# Patient Record
Sex: MALE | Race: ASIAN | NOT HISPANIC OR LATINO
[De-identification: names, ages, dates, MRNs, and addresses within clinical notes are randomized per-mention and may not be internally consistent; named-entity substitution may affect disease eponyms.]

---

## 2017-02-28 ENCOUNTER — TRANSCRIPTION ENCOUNTER (OUTPATIENT)
Age: 57
End: 2017-02-28

## 2017-02-28 ENCOUNTER — CLINICAL ADVICE (OUTPATIENT)
Age: 57
End: 2017-02-28

## 2017-02-28 RX ORDER — DICLOFENAC SODIUM 10 MG/G
1 GEL TOPICAL
Qty: 1 | Refills: 3 | Status: ACTIVE | COMMUNITY
Start: 2017-02-28 | End: 1900-01-01

## 2017-03-30 ENCOUNTER — TRANSCRIPTION ENCOUNTER (OUTPATIENT)
Age: 57
End: 2017-03-30

## 2017-03-31 ENCOUNTER — APPOINTMENT (OUTPATIENT)
Dept: INTERNAL MEDICINE | Facility: CLINIC | Age: 57
End: 2017-03-31

## 2017-03-31 ENCOUNTER — TRANSCRIPTION ENCOUNTER (OUTPATIENT)
Age: 57
End: 2017-03-31

## 2017-03-31 VITALS
HEIGHT: 68 IN | DIASTOLIC BLOOD PRESSURE: 86 MMHG | OXYGEN SATURATION: 98 % | HEART RATE: 70 BPM | TEMPERATURE: 98.6 F | SYSTOLIC BLOOD PRESSURE: 133 MMHG | BODY MASS INDEX: 23.95 KG/M2 | RESPIRATION RATE: 17 BRPM | WEIGHT: 158 LBS

## 2017-03-31 DIAGNOSIS — M72.2 PLANTAR FASCIAL FIBROMATOSIS: ICD-10-CM

## 2017-03-31 DIAGNOSIS — Z23 ENCOUNTER FOR IMMUNIZATION: ICD-10-CM

## 2017-03-31 DIAGNOSIS — Z20.5 CONTACT WITH AND (SUSPECTED) EXPOSURE TO VIRAL HEPATITIS: ICD-10-CM

## 2017-03-31 DIAGNOSIS — M79.674 PAIN IN RIGHT TOE(S): ICD-10-CM

## 2017-03-31 DIAGNOSIS — M54.5 LOW BACK PAIN: ICD-10-CM

## 2017-03-31 DIAGNOSIS — Z11.3 ENCOUNTER FOR SCREENING FOR INFECTIONS WITH A PREDOMINANTLY SEXUAL MODE OF TRANSMISSION: ICD-10-CM

## 2017-03-31 DIAGNOSIS — M25.662 STIFFNESS OF LEFT KNEE, NOT ELSEWHERE CLASSIFIED: ICD-10-CM

## 2017-03-31 RX ORDER — IBUPROFEN 600 MG/1
600 TABLET, FILM COATED ORAL
Qty: 20 | Refills: 0 | Status: ACTIVE | COMMUNITY
Start: 2016-12-02

## 2017-03-31 RX ORDER — CHLORHEXIDINE GLUCONATE, 0.12% ORAL RINSE 1.2 MG/ML
0.12 SOLUTION DENTAL
Qty: 473 | Refills: 0 | Status: COMPLETED | COMMUNITY
Start: 2016-12-02 | End: 2017-03-31

## 2017-03-31 RX ORDER — AMOXICILLIN 500 MG/1
500 CAPSULE ORAL
Qty: 21 | Refills: 0 | Status: COMPLETED | COMMUNITY
Start: 2016-12-02 | End: 2017-03-31

## 2017-04-04 ENCOUNTER — TRANSCRIPTION ENCOUNTER (OUTPATIENT)
Age: 57
End: 2017-04-04

## 2017-04-10 ENCOUNTER — TRANSCRIPTION ENCOUNTER (OUTPATIENT)
Age: 57
End: 2017-04-10

## 2017-06-09 ENCOUNTER — TRANSCRIPTION ENCOUNTER (OUTPATIENT)
Age: 57
End: 2017-06-09

## 2017-06-13 ENCOUNTER — TRANSCRIPTION ENCOUNTER (OUTPATIENT)
Age: 57
End: 2017-06-13

## 2017-07-25 ENCOUNTER — TRANSCRIPTION ENCOUNTER (OUTPATIENT)
Age: 57
End: 2017-07-25

## 2017-08-08 ENCOUNTER — TRANSCRIPTION ENCOUNTER (OUTPATIENT)
Age: 57
End: 2017-08-08

## 2017-08-08 DIAGNOSIS — M47.9 SPONDYLOSIS, UNSPECIFIED: ICD-10-CM

## 2017-08-15 ENCOUNTER — TRANSCRIPTION ENCOUNTER (OUTPATIENT)
Age: 57
End: 2017-08-15

## 2017-08-16 ENCOUNTER — TRANSCRIPTION ENCOUNTER (OUTPATIENT)
Age: 57
End: 2017-08-16

## 2017-08-28 ENCOUNTER — TRANSCRIPTION ENCOUNTER (OUTPATIENT)
Age: 57
End: 2017-08-28

## 2017-08-30 ENCOUNTER — TRANSCRIPTION ENCOUNTER (OUTPATIENT)
Age: 57
End: 2017-08-30

## 2017-08-31 ENCOUNTER — TRANSCRIPTION ENCOUNTER (OUTPATIENT)
Age: 57
End: 2017-08-31

## 2017-09-01 ENCOUNTER — TRANSCRIPTION ENCOUNTER (OUTPATIENT)
Age: 57
End: 2017-09-01

## 2017-09-06 ENCOUNTER — TRANSCRIPTION ENCOUNTER (OUTPATIENT)
Age: 57
End: 2017-09-06

## 2017-09-21 ENCOUNTER — TRANSCRIPTION ENCOUNTER (OUTPATIENT)
Age: 57
End: 2017-09-21

## 2017-09-22 ENCOUNTER — TRANSCRIPTION ENCOUNTER (OUTPATIENT)
Age: 57
End: 2017-09-22

## 2017-10-02 ENCOUNTER — TRANSCRIPTION ENCOUNTER (OUTPATIENT)
Age: 57
End: 2017-10-02

## 2017-11-01 ENCOUNTER — TRANSCRIPTION ENCOUNTER (OUTPATIENT)
Age: 57
End: 2017-11-01

## 2018-01-02 ENCOUNTER — TRANSCRIPTION ENCOUNTER (OUTPATIENT)
Age: 58
End: 2018-01-02

## 2019-02-08 ENCOUNTER — APPOINTMENT (OUTPATIENT)
Dept: UROLOGY | Facility: CLINIC | Age: 59
End: 2019-02-08
Payer: COMMERCIAL

## 2019-02-08 VITALS
WEIGHT: 150 LBS | RESPIRATION RATE: 17 BRPM | TEMPERATURE: 98 F | OXYGEN SATURATION: 97 % | DIASTOLIC BLOOD PRESSURE: 71 MMHG | HEART RATE: 88 BPM | HEIGHT: 68 IN | BODY MASS INDEX: 22.73 KG/M2 | SYSTOLIC BLOOD PRESSURE: 123 MMHG

## 2019-02-08 DIAGNOSIS — N50.812 LEFT TESTICULAR PAIN: ICD-10-CM

## 2019-02-08 DIAGNOSIS — R71.8 OTHER ABNORMALITY OF RED BLOOD CELLS: ICD-10-CM

## 2019-02-08 DIAGNOSIS — M48.00 SPINAL STENOSIS, SITE UNSPECIFIED: ICD-10-CM

## 2019-02-08 PROCEDURE — 99203 OFFICE O/P NEW LOW 30 MIN: CPT

## 2019-02-09 LAB
ANION GAP SERPL CALC-SCNC: 14 MMOL/L
APPEARANCE: CLEAR
BACTERIA: NEGATIVE
BILIRUBIN URINE: NEGATIVE
BLOOD URINE: NEGATIVE
BUN SERPL-MCNC: 16 MG/DL
CALCIUM SERPL-MCNC: 9.2 MG/DL
CHLORIDE SERPL-SCNC: 102 MMOL/L
CO2 SERPL-SCNC: 27 MMOL/L
COLOR: YELLOW
CREAT SERPL-MCNC: 1.13 MG/DL
GLUCOSE QUALITATIVE U: NEGATIVE MG/DL
GLUCOSE SERPL-MCNC: 68 MG/DL
HYALINE CASTS: 1 /LPF
KETONES URINE: ABNORMAL
LEUKOCYTE ESTERASE URINE: NEGATIVE
MICROSCOPIC-UA: NORMAL
NITRITE URINE: NEGATIVE
PH URINE: 6
POTASSIUM SERPL-SCNC: 4.1 MMOL/L
PROTEIN URINE: NEGATIVE MG/DL
PSA FREE FLD-MCNC: 33 %
PSA FREE SERPL-MCNC: 0.3 NG/ML
PSA SERPL-MCNC: 0.91 NG/ML
RED BLOOD CELLS URINE: 1 /HPF
SODIUM SERPL-SCNC: 143 MMOL/L
SPECIFIC GRAVITY URINE: 1.02
SQUAMOUS EPITHELIAL CELLS: 0 /HPF
UROBILINOGEN URINE: NEGATIVE MG/DL
WHITE BLOOD CELLS URINE: 1 /HPF

## 2019-02-10 NOTE — LETTER BODY
[FreeTextEntry1] : Maribel Rodriguez MD\par [address] [address]\par P [phone]\par F [phone]\par \par Dear Dr. Rodriguez,\par \par Reason for Visit: Erectile Dysfunction. Left testicular discomfort\par \par This is a 58 year-old male  s/p previous laminectomy for spinal stenosis presents with erectile dysfunction and intermittent left testicular discomfort. Patient is referred for evaluation of his condition. He reports no improvement of ED symptoms while taking Viagra and Cialis. He obtained an unremarkable abdominal MRI at [facility] in 2016. Patient denies any gross hematuria or dysuria or urinary incontinence. The patient denies any aggravating or relieving factors. The patient denies any interference of function. The patient is entirely asymptomatic. All other review of systems are negative. He has no cancer in his family medical history. He has no previous surgical history. Past medical history, family history and social history were inquired and were noncontributory to current condition. The patient does not use tobacco or drink alcohol. Medications and allergies were reviewed. He has no known allergies to medication. \par \par On examination, the patient is a healthy-appearing gentleman in no acute distress. He is alert and oriented follows commands. He has normal mood and affect. He is normocephalic. Oral no thrush. Neck is supple. Respirations are unlabored. His abdomen is soft and nontender. Liver is nonpalpable. Bladder is nonpalpable. No CVA tenderness. Neurologically he is grossly intact. No peripheral edema. Skin without gross abnormality. He has normal male external genitalia. Normal meatus. Bilateral testes are descended intrascrotally and normal to palpation. On rectal examination, there is normal sphincter tone. The prostate is clinically benign without focal induration or nodularity.\par \par Assessment: Erectile Dysfunction. Left testicular discomfort.\par \par I counseled the patient. I discussed the various etiologies of his symptoms. In terms of his erectile dysfunction and left testicular discomfort; he has minimal bother. I recommed conservative management. he will obtain BMP, PSA and urinalysis today to further evaluate. Risks and alternatives were discussed. I answered the patient questions. The patient will follow-up as directed and will contact me with any questions or concerns. Thank you for the opportunity to participate in the care of this patient. I'll keep you updated on his progress.\par \par Plan: BMP. PSA. Urinalysis. Follow up in 1 year.

## 2019-02-10 NOTE — ADDENDUM
[FreeTextEntry1] : Entered by Tanya Alford, acting as scribe for Dr. Maxime Rocha.\par \par The documentation recorded by the scribe accurately reflects the service I personally performed and the decisions made by me.

## 2019-03-07 ENCOUNTER — TRANSCRIPTION ENCOUNTER (OUTPATIENT)
Age: 59
End: 2019-03-07

## 2019-03-08 ENCOUNTER — APPOINTMENT (OUTPATIENT)
Dept: CARDIOLOGY | Facility: CLINIC | Age: 59
End: 2019-03-08
Payer: COMMERCIAL

## 2019-03-08 ENCOUNTER — NON-APPOINTMENT (OUTPATIENT)
Age: 59
End: 2019-03-08

## 2019-03-08 VITALS
OXYGEN SATURATION: 98 % | WEIGHT: 150 LBS | RESPIRATION RATE: 17 BRPM | HEART RATE: 76 BPM | BODY MASS INDEX: 22.73 KG/M2 | SYSTOLIC BLOOD PRESSURE: 125 MMHG | DIASTOLIC BLOOD PRESSURE: 72 MMHG | TEMPERATURE: 98 F | HEIGHT: 68 IN

## 2019-03-08 DIAGNOSIS — E78.5 HYPERLIPIDEMIA, UNSPECIFIED: ICD-10-CM

## 2019-03-08 DIAGNOSIS — Z82.49 FAMILY HISTORY OF ISCHEMIC HEART DISEASE AND OTHER DISEASES OF THE CIRCULATORY SYSTEM: ICD-10-CM

## 2019-03-08 PROCEDURE — 99204 OFFICE O/P NEW MOD 45 MIN: CPT

## 2019-03-08 PROCEDURE — 93000 ELECTROCARDIOGRAM COMPLETE: CPT

## 2019-03-08 NOTE — PHYSICAL EXAM
[General Appearance - Well Developed] : well developed [Normal Appearance] : normal appearance [Well Groomed] : well groomed [General Appearance - Well Nourished] : well nourished [No Deformities] : no deformities [General Appearance - In No Acute Distress] : no acute distress [Normal Conjunctiva] : the conjunctiva exhibited no abnormalities [Eyelids - No Xanthelasma] : the eyelids demonstrated no xanthelasmas [Normal Oral Mucosa] : normal oral mucosa [No Oral Pallor] : no oral pallor [No Oral Cyanosis] : no oral cyanosis [Normal Jugular Venous A Waves Present] : normal jugular venous A waves present [Normal Jugular Venous V Waves Present] : normal jugular venous V waves present [No Jugular Venous Marinelli A Waves] : no jugular venous marinelli A waves [Heart Rate And Rhythm] : heart rate and rhythm were normal [Heart Sounds] : normal S1 and S2 [Murmurs] : no murmurs present [Respiration, Rhythm And Depth] : normal respiratory rhythm and effort [Exaggerated Use Of Accessory Muscles For Inspiration] : no accessory muscle use [Auscultation Breath Sounds / Voice Sounds] : lungs were clear to auscultation bilaterally [Abdomen Soft] : soft [Abdomen Tenderness] : non-tender [Abdomen Mass (___ Cm)] : no abdominal mass palpated [Abnormal Walk] : normal gait [Gait - Sufficient For Exercise Testing] : the gait was sufficient for exercise testing [Nail Clubbing] : no clubbing of the fingernails [Cyanosis, Localized] : no localized cyanosis [Petechial Hemorrhages (___cm)] : no petechial hemorrhages [] : no ischemic changes [Oriented To Time, Place, And Person] : oriented to person, place, and time [Affect] : the affect was normal [Mood] : the mood was normal [No Anxiety] : not feeling anxious

## 2019-03-19 PROBLEM — E78.5 HLD (HYPERLIPIDEMIA): Status: ACTIVE | Noted: 2019-03-19

## 2019-03-28 ENCOUNTER — RECORD ABSTRACTING (OUTPATIENT)
Age: 59
End: 2019-03-28

## 2019-04-01 ENCOUNTER — TRANSCRIPTION ENCOUNTER (OUTPATIENT)
Age: 59
End: 2019-04-01

## 2019-04-19 ENCOUNTER — APPOINTMENT (OUTPATIENT)
Dept: CARDIOLOGY | Facility: CLINIC | Age: 59
End: 2019-04-19

## 2019-04-19 ENCOUNTER — APPOINTMENT (OUTPATIENT)
Dept: CARDIOLOGY | Facility: CLINIC | Age: 59
End: 2019-04-19
Payer: COMMERCIAL

## 2019-04-19 VITALS
RESPIRATION RATE: 17 BRPM | SYSTOLIC BLOOD PRESSURE: 124 MMHG | DIASTOLIC BLOOD PRESSURE: 75 MMHG | HEART RATE: 69 BPM | BODY MASS INDEX: 22.96 KG/M2 | OXYGEN SATURATION: 98 % | TEMPERATURE: 98.5 F | WEIGHT: 151 LBS

## 2019-04-19 PROCEDURE — 93306 TTE W/DOPPLER COMPLETE: CPT

## 2019-04-19 PROCEDURE — 93015 CV STRESS TEST SUPVJ I&R: CPT

## 2020-02-07 ENCOUNTER — APPOINTMENT (OUTPATIENT)
Dept: UROLOGY | Facility: CLINIC | Age: 60
End: 2020-02-07
Payer: COMMERCIAL

## 2020-02-07 VITALS
HEART RATE: 92 BPM | DIASTOLIC BLOOD PRESSURE: 80 MMHG | SYSTOLIC BLOOD PRESSURE: 126 MMHG | TEMPERATURE: 97.7 F | RESPIRATION RATE: 17 BRPM | BODY MASS INDEX: 23.87 KG/M2 | OXYGEN SATURATION: 98 % | WEIGHT: 157 LBS

## 2020-02-07 DIAGNOSIS — Z00.00 ENCOUNTER FOR GENERAL ADULT MEDICAL EXAMINATION W/OUT ABNORMAL FINDINGS: ICD-10-CM

## 2020-02-07 PROCEDURE — 99214 OFFICE O/P EST MOD 30 MIN: CPT

## 2020-02-07 RX ORDER — TADALAFIL 20 MG/1
20 TABLET ORAL
Qty: 12 | Refills: 3 | Status: ACTIVE | COMMUNITY
Start: 2016-10-20 | End: 1900-01-01

## 2020-02-07 NOTE — ADDENDUM
[FreeTextEntry1] : Entered by Michael Granda, acting as scribe for Dr. Maxime Rocha.\par \par The documentation recorded by the scribe accurately reflects the service I personally performed and the decisions made by me.

## 2020-02-07 NOTE — LETTER BODY
[FreeTextEntry1] : Rosangela DO Pat\par [address] [facility]\par [address] [facility], [place] [address]\par Phone: [phone]\par \par Dear Dr. Stewart\par \par Reason for Visit: Erectile Dysfunction. Left testicular discomfort.\par \par This is a 59 year-old male  s/p previous laminectomy for spinal stenosis presents with erectile dysfunction and intermittent left testicular discomfort. He obtained an unremarkable abdominal MRI at [facility] in 2016. Patient returns today for follow-up. Since his last visit, he reports taking Cialis as needed without any difficulties. He is overall well. He denies any testicular pain or discomfort. All other review of systems are negative. Past medical history, family history and social history were unchanged. Medications and allergies were reviewed. He has no known allergies to medication. \par \par On examination, the patient is a healthy-appearing gentleman in no acute distress. He is alert and oriented follows commands. He has normal mood and affect. He is normocephalic. Oral no thrush. Neck is supple. Respirations are unlabored. His abdomen is soft and nontender. Liver is nonpalpable. Bladder is nonpalpable. No CVA tenderness. Neurologically he is grossly intact. No peripheral edema. Skin without gross abnormality. He has normal male external genitalia. Normal meatus. Bilateral testes are descended intrascrotally and normal to palpation. On rectal examination, there is normal sphincter tone. The prostate is clinically benign without focal induration or nodularity.\par \par His BMP demonstrated normal renal functions, creatinine 1.13. His PSA was 0.9, which is within normal limits. His urinalysis was unremarkable. His urine culture was negative. \par \par Assessment: Erectile Dysfunction. Left testicular discomfort, resolved.\par \par I counseled the patient. Patient is overall well and he denies any current testicular discomfort. In terms of his erectile dysfunction, his symptoms are stable with Cialis. I renewed the patient's prescription for Cialis today. I encouraged the patient to continue medications regularly as directed. I recommend he repeat BMP, PSA, and urinalysis today to ensure stability. Patient understands that if he develops gross hematuria or any urinary discomfort, he will contact me for further evaluation.  Risks and alternatives were discussed. I answered the patient questions. The patient will follow-up as directed and will contact me with any questions or concerns. Thank you for the opportunity to participate in the care of this patient. I'll keep you updated on his progress.\par \par Plan: Continue Cialis. BMP. PSA. Urinalysis. Follow up in 1 year.

## 2020-02-09 LAB
ANION GAP SERPL CALC-SCNC: 14 MMOL/L
APPEARANCE: CLEAR
BACTERIA: NEGATIVE
BILIRUBIN URINE: NEGATIVE
BLOOD URINE: NEGATIVE
BUN SERPL-MCNC: 14 MG/DL
CALCIUM SERPL-MCNC: 9.1 MG/DL
CHLORIDE SERPL-SCNC: 103 MMOL/L
CO2 SERPL-SCNC: 26 MMOL/L
COLOR: YELLOW
CREAT SERPL-MCNC: 1.2 MG/DL
GLUCOSE QUALITATIVE U: NEGATIVE
GLUCOSE SERPL-MCNC: 90 MG/DL
HYALINE CASTS: 1 /LPF
KETONES URINE: NEGATIVE
LEUKOCYTE ESTERASE URINE: NEGATIVE
MICROSCOPIC-UA: NORMAL
NITRITE URINE: NEGATIVE
PH URINE: 6
POTASSIUM SERPL-SCNC: 4.4 MMOL/L
PROTEIN URINE: NEGATIVE
PSA FREE FLD-MCNC: 36 %
PSA FREE SERPL-MCNC: 0.35 NG/ML
PSA SERPL-MCNC: 0.97 NG/ML
RED BLOOD CELLS URINE: 1 /HPF
SODIUM SERPL-SCNC: 143 MMOL/L
SPECIFIC GRAVITY URINE: 1.02
SQUAMOUS EPITHELIAL CELLS: 0 /HPF
UROBILINOGEN URINE: NORMAL
WHITE BLOOD CELLS URINE: 0 /HPF

## 2021-01-20 NOTE — DISCUSSION/SUMMARY
[FreeTextEntry1] : 58 year-old male with elevated ferritin getting phlebotomy every 3 months presents for cardiac evaluation.  Patient has family history of heart issues.  Father had bypass surgery and brother had stent placed recently.  Patient denies CP, SOB, palpitations, or lightheadedness.  Patient had back surgery a few years ago and he does back exercises since.  Patient would like to get a stress test pending insurance authorization.   I advised patient that he could get a calcium score test to see if he should start on statin therapy since he has borderline high  and family history.\par \par (1) Family history of CAD - Patient underwent an echocardiogram and it showed normal LV function without significant valvular pathology. Patient underwent a treadmill stress test and completed 10 minutes of Duncan protocol.  There were upsloping ST depressions on ECG but no symptoms. Following treadmill stress, there was no echocardiographic evidence of ischemia.  Patient was reassured.  I advised patient to consider calcium score test to see if he may benefit from statin therapy.\par \par (2) Followup - 1 year.

## 2021-01-20 NOTE — HISTORY OF PRESENT ILLNESS
[FreeTextEntry1] : 58 year-old male with elevated ferritin getting phlebotomy every 3 months presents for cardiac evaluation.  Patient has family history of heart issues.  Father had bypass surgery and brother had stent placed recently.  Patient denies CP, SOB, palpitations, or lightheadedness.  Patient had back surgery a few years ago and he does back exercises since.  Patient would like to get a stress test pending insurance authorization.   I advised patient that he could get a calcium score test to see if he should start on statin therapy since he has borderline high  and family history.

## 2021-01-22 ENCOUNTER — NON-APPOINTMENT (OUTPATIENT)
Age: 61
End: 2021-01-22

## 2021-01-22 ENCOUNTER — APPOINTMENT (OUTPATIENT)
Dept: CARDIOLOGY | Facility: CLINIC | Age: 61
End: 2021-01-22
Payer: COMMERCIAL

## 2021-01-22 VITALS
DIASTOLIC BLOOD PRESSURE: 80 MMHG | HEART RATE: 97 BPM | RESPIRATION RATE: 17 BRPM | BODY MASS INDEX: 23.57 KG/M2 | WEIGHT: 155 LBS | TEMPERATURE: 97 F | SYSTOLIC BLOOD PRESSURE: 127 MMHG | OXYGEN SATURATION: 98 %

## 2021-01-22 PROCEDURE — 93015 CV STRESS TEST SUPVJ I&R: CPT

## 2021-01-22 PROCEDURE — 93000 ELECTROCARDIOGRAM COMPLETE: CPT | Mod: 59

## 2021-01-22 PROCEDURE — 93306 TTE W/DOPPLER COMPLETE: CPT

## 2021-01-22 PROCEDURE — 99072 ADDL SUPL MATRL&STAF TM PHE: CPT

## 2021-01-22 PROCEDURE — 99214 OFFICE O/P EST MOD 30 MIN: CPT | Mod: 25

## 2021-01-22 NOTE — DISCUSSION/SUMMARY
[FreeTextEntry1] : 60 year-old male with elevated ferritin getting phlebotomy every 3 months presents for followup.  \par \par Patient was last seen on 3/8/19 for cardiac evaluation.  Patient underwent an echocardiogram and it showed normal LV function without significant valvular pathology. Patient underwent a treadmill stress test and completed 10 minutes of Duncan protocol.  There were upsloping ST depressions on ECG but no symptoms. Following treadmill stress, there was no echocardiographic evidence of ischemia.  I advised patient to consider calcium score test to see if he may benefit from statin therapy.\par \par Last echo was on 3/8/19 and it showed normal LV function without significant valvular pathology. \par \par Last stress was on 3/8/19 and completed 10 minutes of Duncan protocol.  There were upsloping ST depressions on ECG but no symptoms. Following treadmill stress, there was no echocardiographic evidence of ischemia.  \par \par 1/22/21 - Patient has been stable.  Patient reports rare SOB with stairs.  Patient denies CP.  Patient denies palpitations.  Patient denies lightheadedness.  Patient denies h/o syncope.  He is concerned about his family history.  He is on no medications.  His LDL is borderline as per patient.  He did not pursue a calcium score test after last visit.  \par \par OLD NOTE - \par \par (1) Family history of CAD - Patient underwent an echocardiogram and it showed normal LV function without significant valvular pathology. Patient underwent a treadmill stress test and completed 10 minutes of Duncan protocol.  There were upsloping ST depressions on ECG but no symptoms. Following treadmill stress, there was no echocardiographic evidence of ischemia.  Patient was reassured.  I advised patient to consider calcium score test to see if he may benefit from statin therapy.\par \par (2) Followup - 1 year.

## 2021-01-22 NOTE — PHYSICAL EXAM
[General Appearance - Well Developed] : well developed [Normal Appearance] : normal appearance [Well Groomed] : well groomed [General Appearance - Well Nourished] : well nourished [No Deformities] : no deformities [General Appearance - In No Acute Distress] : no acute distress [Normal Conjunctiva] : the conjunctiva exhibited no abnormalities [Eyelids - No Xanthelasma] : the eyelids demonstrated no xanthelasmas [Normal Oral Mucosa] : normal oral mucosa [No Oral Pallor] : no oral pallor [No Oral Cyanosis] : no oral cyanosis [Normal Jugular Venous A Waves Present] : normal jugular venous A waves present [Normal Jugular Venous V Waves Present] : normal jugular venous V waves present [No Jugular Venous Marinelli A Waves] : no jugular venous marinelli A waves [Exaggerated Use Of Accessory Muscles For Inspiration] : no accessory muscle use [Respiration, Rhythm And Depth] : normal respiratory rhythm and effort [Auscultation Breath Sounds / Voice Sounds] : lungs were clear to auscultation bilaterally [Heart Rate And Rhythm] : heart rate and rhythm were normal [Murmurs] : no murmurs present [Heart Sounds] : normal S1 and S2 [Abdomen Soft] : soft [Abdomen Tenderness] : non-tender [Abnormal Walk] : normal gait [Abdomen Mass (___ Cm)] : no abdominal mass palpated [Gait - Sufficient For Exercise Testing] : the gait was sufficient for exercise testing [Nail Clubbing] : no clubbing of the fingernails [Cyanosis, Localized] : no localized cyanosis [Petechial Hemorrhages (___cm)] : no petechial hemorrhages [] : no ischemic changes [Oriented To Time, Place, And Person] : oriented to person, place, and time [Affect] : the affect was normal [Mood] : the mood was normal [No Anxiety] : not feeling anxious [Arterial Pulses Normal] : the arterial pulses were normal [Edema] : no peripheral edema present

## 2021-01-22 NOTE — HISTORY OF PRESENT ILLNESS
[FreeTextEntry1] : 60 year-old male with elevated ferritin getting phlebotomy every 3 months presents for followup.  \par \par Patient was last seen on 3/8/19 for cardiac evaluation.  Patient underwent an echocardiogram and it showed normal LV function without significant valvular pathology. Patient underwent a treadmill stress test and completed 10 minutes of Duncan protocol.  There were upsloping ST depressions on ECG but no symptoms. Following treadmill stress, there was no echocardiographic evidence of ischemia.  I advised patient to consider calcium score test to see if he may benefit from statin therapy.\par \par Last echo was on 3/8/19 and it showed normal LV function without significant valvular pathology. \par \par Last stress was on 3/8/19 and completed 10 minutes of Duncan protocol.  There were upsloping ST depressions on ECG but no symptoms. Following treadmill stress, there was no echocardiographic evidence of ischemia.  \par \par 1/22/21 - Patient has been stable.  Patient reports rare SOB with stairs.  Patient denies CP.  Patient denies palpitations.  Patient denies lightheadedness.  Patient denies h/o syncope.  He is concerned about his family history.  He is on no medications.  His LDL is borderline as per patient.  He did not pursue a calcium score test after last visit.

## 2021-01-22 NOTE — REASON FOR VISIT
[Follow-Up - Clinic] : a clinic follow-up of [FreeTextEntry1] : 1/22/21 - Patient has been stable.  Patient reports rare SOB with stairs.  Patient denies CP.  Patient denies palpitations.  Patient denies lightheadedness.  Patient denies h/o syncope.  He is concerned about his family history.  He is on no medications.  His LDL is borderline as per patient.  He did not pursue a calcium score test after last visit.  \par \par 3/8/19 - Patient has family history of heart issues.  Father had bypass surgery and brother had stent placed recently.  Patient denies CP, SOB, palpitations, or lightheadedness.  Patient had back surgery a few years ago and he does back exercises since.  Patient would like to get a stress test pending insurance authorization.   I advised patient that he could get a calcium score test to see if he should start on statin therapy since he has borderline high  and family history.

## 2021-02-05 ENCOUNTER — APPOINTMENT (OUTPATIENT)
Dept: UROLOGY | Facility: CLINIC | Age: 61
End: 2021-02-05
Payer: COMMERCIAL

## 2021-02-05 VITALS
BODY MASS INDEX: 24.33 KG/M2 | WEIGHT: 160 LBS | SYSTOLIC BLOOD PRESSURE: 124 MMHG | DIASTOLIC BLOOD PRESSURE: 75 MMHG | TEMPERATURE: 97.6 F | OXYGEN SATURATION: 96 % | HEART RATE: 95 BPM | RESPIRATION RATE: 17 BRPM

## 2021-02-05 DIAGNOSIS — R45.89 OTHER SYMPTOMS AND SIGNS INVOLVING EMOTIONAL STATE: ICD-10-CM

## 2021-02-05 DIAGNOSIS — D75.1 SECONDARY POLYCYTHEMIA: ICD-10-CM

## 2021-02-05 PROCEDURE — 99072 ADDL SUPL MATRL&STAF TM PHE: CPT

## 2021-02-05 PROCEDURE — 99213 OFFICE O/P EST LOW 20 MIN: CPT

## 2021-02-05 NOTE — LETTER BODY
[FreeTextEntry1] : Rosangela DO Pat\par [address] [facility]\par [address] [facility], [place] [address]\par Phone: [phone]\par \par Dear Dr. Stewart\par \par Reason for Visit: Erectile Dysfunction.\par \par This is a 60 year-old male  s/p previous laminectomy for spinal stenosis and previous left testicular discomfort, presenting with erectile dysfunction. He obtained an unremarkable abdominal MRI at [facility] in 2016. He returns today for follow-up. Since he was last seen, the patient reports that he continues to take Cialis PRN without any difficulties. The patient reports that he is overall well. He denies any testicular pain or discomfort. He denies any changes in health. All other review of systems are negative. Past medical history, family history and social history were unchanged. Medications and allergies were reviewed. He has no known allergies to medication. \par \par On examination, the patient is a healthy-appearing gentleman in no acute distress. He is alert and oriented follows commands. He has normal mood and affect. He is normocephalic. Oral no thrush. Neck is supple. Respirations are unlabored. His abdomen is soft and nontender. Liver is nonpalpable. Bladder is nonpalpable. No CVA tenderness. Neurologically he is grossly intact. No peripheral edema. Skin without gross abnormality. He has normal male external genitalia. Normal meatus. Bilateral testes are descended intrascrotally and normal to palpation. On rectal examination, there is normal sphincter tone. The prostate is clinically benign without focal induration or nodularity.\par \par His last BMP in February 2020 demonstrated normal renal functions, creatinine 1.2. His PSA was 0.97, which is within normal limits. His urinalysis was unremarkable.\par \par Assessment: Erectile Dysfunction.\par \par I counseled the patient. He is doing well. In terms of his erectile dysfunction, I recommended the patient continue taking Cialis PRN. He will repeat BMP and PSA today to ensure stability. The patient will also obtain urinalysis today. He will follow-up in 1 year to ensure stability. Risks and alternatives were discussed. I answered the patient questions. The patient will follow-up as directed and will contact me with any questions or concerns. Thank you for the opportunity to participate in the care of Mr. HARPER. I will keep you updated on his progress.\par \par Plan: Continue Cialis PRN. BMP. PSA. Urinalysis. Follow-up in 1 year.

## 2021-02-05 NOTE — ADDENDUM
[FreeTextEntry1] : Entered by German Benjamin, acting as scribe for Dr. Maxime Rocha.\par \par The documentation recorded by the scribe accurately reflects the service I personally performed and the decisions made by me.\par

## 2021-02-06 LAB
ANION GAP SERPL CALC-SCNC: 17 MMOL/L
APPEARANCE: CLEAR
BACTERIA: NEGATIVE
BILIRUBIN URINE: NEGATIVE
BLOOD URINE: NEGATIVE
BUN SERPL-MCNC: 15 MG/DL
CALCIUM SERPL-MCNC: 9.7 MG/DL
CHLORIDE SERPL-SCNC: 102 MMOL/L
CO2 SERPL-SCNC: 22 MMOL/L
COLOR: NORMAL
CREAT SERPL-MCNC: 1.14 MG/DL
GLUCOSE QUALITATIVE U: NEGATIVE
GLUCOSE SERPL-MCNC: 75 MG/DL
HYALINE CASTS: 0 /LPF
KETONES URINE: NEGATIVE
LEUKOCYTE ESTERASE URINE: NEGATIVE
MICROSCOPIC-UA: NORMAL
NITRITE URINE: NEGATIVE
PH URINE: 6
POTASSIUM SERPL-SCNC: 4.4 MMOL/L
PROTEIN URINE: NEGATIVE
PSA FREE FLD-MCNC: 36 %
PSA FREE SERPL-MCNC: 0.33 NG/ML
PSA SERPL-MCNC: 0.93 NG/ML
RED BLOOD CELLS URINE: 0 /HPF
SODIUM SERPL-SCNC: 142 MMOL/L
SPECIFIC GRAVITY URINE: 1.01
SQUAMOUS EPITHELIAL CELLS: 0 /HPF
UROBILINOGEN URINE: NORMAL
WHITE BLOOD CELLS URINE: 0 /HPF

## 2022-01-29 NOTE — PHYSICAL EXAM
[General Appearance - Well Developed] : well developed [Normal Appearance] : normal appearance [Well Groomed] : well groomed [General Appearance - Well Nourished] : well nourished [No Deformities] : no deformities [General Appearance - In No Acute Distress] : no acute distress [Normal Conjunctiva] : the conjunctiva exhibited no abnormalities [Eyelids - No Xanthelasma] : the eyelids demonstrated no xanthelasmas [Normal Oral Mucosa] : normal oral mucosa [No Oral Pallor] : no oral pallor [No Oral Cyanosis] : no oral cyanosis [Normal Jugular Venous A Waves Present] : normal jugular venous A waves present [Normal Jugular Venous V Waves Present] : normal jugular venous V waves present [No Jugular Venous Marinelli A Waves] : no jugular venous marinelli A waves [Respiration, Rhythm And Depth] : normal respiratory rhythm and effort [Exaggerated Use Of Accessory Muscles For Inspiration] : no accessory muscle use [Auscultation Breath Sounds / Voice Sounds] : lungs were clear to auscultation bilaterally [Heart Rate And Rhythm] : heart rate and rhythm were normal [Heart Sounds] : normal S1 and S2 [Murmurs] : no murmurs present [Arterial Pulses Normal] : the arterial pulses were normal [Edema] : no peripheral edema present [Abdomen Soft] : soft [Abdomen Tenderness] : non-tender [Abdomen Mass (___ Cm)] : no abdominal mass palpated [Abnormal Walk] : normal gait [Gait - Sufficient For Exercise Testing] : the gait was sufficient for exercise testing [Nail Clubbing] : no clubbing of the fingernails [Cyanosis, Localized] : no localized cyanosis [Petechial Hemorrhages (___cm)] : no petechial hemorrhages [] : no ischemic changes [Oriented To Time, Place, And Person] : oriented to person, place, and time [Affect] : the affect was normal [Mood] : the mood was normal [No Anxiety] : not feeling anxious

## 2022-02-04 ENCOUNTER — NON-APPOINTMENT (OUTPATIENT)
Age: 62
End: 2022-02-04

## 2022-02-04 ENCOUNTER — APPOINTMENT (OUTPATIENT)
Dept: CARDIOLOGY | Facility: CLINIC | Age: 62
End: 2022-02-04
Payer: COMMERCIAL

## 2022-02-04 VITALS
BODY MASS INDEX: 23.57 KG/M2 | TEMPERATURE: 98 F | HEART RATE: 89 BPM | SYSTOLIC BLOOD PRESSURE: 131 MMHG | DIASTOLIC BLOOD PRESSURE: 86 MMHG | RESPIRATION RATE: 18 BRPM | WEIGHT: 155 LBS | OXYGEN SATURATION: 99 %

## 2022-02-04 DIAGNOSIS — Z09 ENCOUNTER FOR FOLLOW-UP EXAMINATION AFTER COMPLETED TREATMENT FOR CONDITIONS OTHER THAN MALIGNANT NEOPLASM: ICD-10-CM

## 2022-02-04 DIAGNOSIS — R79.89 OTHER SPECIFIED ABNORMAL FINDINGS OF BLOOD CHEMISTRY: ICD-10-CM

## 2022-02-04 PROCEDURE — 93000 ELECTROCARDIOGRAM COMPLETE: CPT | Mod: 59

## 2022-02-04 PROCEDURE — 99214 OFFICE O/P EST MOD 30 MIN: CPT

## 2022-02-08 NOTE — CARDIOLOGY SUMMARY
[Normal] : normal [de-identified] : Patient underwent a treadmill stress test 1/22/21 and completed 12 minutes of Duncan protocol.  There was no ECG evidence of ischemia.  Following treadmill stress, there was no echocardiographic evidence of ischemia.   [de-identified] : Patient underwent an echocardiogram 1/22/21 and it showed normal LV function without significant valvular pathology.   [de-identified] : Patient underwent calcium score CT 2/19/21 and the score is 125.

## 2022-02-08 NOTE — HISTORY OF PRESENT ILLNESS
[FreeTextEntry1] : 2/4/22 - Patient has been stable.  Patient denies CP, SOB, palpitations, or lightheadedness.  He is now on Simvastatin 10 mg and ASA 81 mg.  His recent LDL was 88.  His recent ferritin was 109.  I advised patient to undergo an echocardiogram and a treadmill stress test.  I will obtain insurance authorization (elevated calcium score, family history of CAD).  I advised patient to continue Simvastatin 10 mg.  He may stop ASA 81 mg.\par \par 1/22/21 - Patient has been stable.  Patient reports rare SOB with stairs.  Patient denies CP.  Patient denies palpitations.  Patient denies lightheadedness.  Patient denies h/o syncope.  He is concerned about his family history.  He is on no medications.  His LDL is borderline as per patient.  He did not pursue a calcium score test after last visit.  Patient underwent calcium score CT and the score is 125.  I advised patient to start statin therapy.\par \par 3/8/19 - Patient has family history of heart issues.  Father had bypass surgery and brother had stent placed recently.  Patient denies CP, SOB, palpitations, or lightheadedness.  Patient had back surgery a few years ago and he does back exercises since.  Patient would like to get a stress test pending insurance authorization.   I advised patient that he could get a calcium score test to see if he should start on statin therapy since he has borderline high  and family history.

## 2022-02-08 NOTE — REASON FOR VISIT
[FreeTextEntry1] : 61 year-old male with hemochromatosis getting phlebotomy every 3 months presents for followup.  \par \par Patient was last seen on 1/22/21.  Patient underwent an echocardiogram and it showed normal LV function without significant valvular pathology.  Patient underwent a treadmill stress test and completed 12 minutes of Duncan protocol.  There was no ECG evidence of ischemia.  Following treadmill stress, there was no echocardiographic evidence of ischemia.  Patient underwent calcium score CT and the score is 125.  I advised patient to start statin therapy.\par \par Patient underwent calcium score CT 2/19/21 and the score is 125.\par \par Patient underwent an echocardiogram 1/22/21 and it showed normal LV function without significant valvular pathology.  \par \par Patient underwent a treadmill stress test 1/22/21 and completed 12 minutes of Duncan protocol.  There was no ECG evidence of ischemia.  Following treadmill stress, there was no echocardiographic evidence of ischemia.

## 2022-02-18 ENCOUNTER — APPOINTMENT (OUTPATIENT)
Dept: UROLOGY | Facility: CLINIC | Age: 62
End: 2022-02-18
Payer: COMMERCIAL

## 2022-02-18 VITALS
HEART RATE: 82 BPM | BODY MASS INDEX: 23.57 KG/M2 | WEIGHT: 155 LBS | RESPIRATION RATE: 16 BRPM | TEMPERATURE: 97.2 F | OXYGEN SATURATION: 98 % | DIASTOLIC BLOOD PRESSURE: 82 MMHG | SYSTOLIC BLOOD PRESSURE: 117 MMHG

## 2022-02-18 PROCEDURE — 99213 OFFICE O/P EST LOW 20 MIN: CPT

## 2022-02-18 NOTE — LETTER BODY
[FreeTextEntry1] : Rosangela DO Pat\par [address] [facility]\par J[address] [facility], [place] [address]\par Phone: [phone]\par \par Dear Dr. Stewart\par \par Reason for Visit: Erectile Dysfunction.\par \par This is a 61 year-old male  s/p previous laminectomy for spinal stenosis and previous left testicular discomfort, presenting with erectile dysfunction. He obtained an unremarkable abdominal MRI at [facility] in 2016. He returns today for follow-up. Since he was last seen, the patient reports that he has discontinued Cialis. He reports that he is overall well. He denies any testicular pain or discomfort. He denies any changes in health. All other review of systems are negative. Past medical history, family history and social history were unchanged. Medications and allergies were reviewed. He has no known allergies to medication. \par \par On examination, the patient is a healthy-appearing gentleman in no acute distress. He is alert and oriented follows commands. He has normal mood and affect. He is normocephalic. Oral no thrush. Neck is supple. Respirations are unlabored. His abdomen is soft and nontender. Liver is nonpalpable. Bladder is nonpalpable. No CVA tenderness. Neurologically he is grossly intact. No peripheral edema. Skin without gross abnormality. He has normal male external genitalia. Normal meatus. Bilateral testes are descended intrascrotally and normal to palpation. On rectal examination, there is normal sphincter tone. The prostate is clinically benign without focal induration or nodularity.\par \par His last BMP in February 2021 demonstrated normal renal functions, creatinine 1.14. His PSA was 0.93, which is within normal limits. His urinalysis was unremarkable.\par \par Assessment: Erectile Dysfunction.\par \par I counseled the patient. He is doing well. In terms of his erectile dysfunction, the patient has discontinued Cialis. He is doing well without the medication. I recommended the patient repeat BMP and PSA today to ensure stability. The patient will also obtain urinalysis today. He will follow-up in 1 year to ensure stability. Risks and alternatives were discussed. I answered the patient questions. The patient will follow-up as directed and will contact me with any questions or concerns. Thank you for the opportunity to participate in the care of Mr. HARPER. I will keep you updated on his progress.\par \par Plan: BMP. PSA. Urinalysis. Follow-up in 1 year.

## 2022-02-18 NOTE — ADDENDUM
[FreeTextEntry1] : Entered by Daniela Shine, acting as scribe for Dr. Maxime Rocha.\par \par The documentation recorded by the scribe accurately reflects the service I personally performed and the decisions made by me.

## 2022-02-19 LAB
ANION GAP SERPL CALC-SCNC: 12 MMOL/L
APPEARANCE: CLEAR
BACTERIA: NEGATIVE
BILIRUBIN URINE: NEGATIVE
BLOOD URINE: NEGATIVE
BUN SERPL-MCNC: 16 MG/DL
CALCIUM SERPL-MCNC: 8.9 MG/DL
CHLORIDE SERPL-SCNC: 101 MMOL/L
CO2 SERPL-SCNC: 25 MMOL/L
COLOR: COLORLESS
CREAT SERPL-MCNC: 1.14 MG/DL
GLUCOSE QUALITATIVE U: NEGATIVE
GLUCOSE SERPL-MCNC: 106 MG/DL
HYALINE CASTS: 0 /LPF
KETONES URINE: NEGATIVE
LEUKOCYTE ESTERASE URINE: NEGATIVE
MICROSCOPIC-UA: NORMAL
NITRITE URINE: NEGATIVE
PH URINE: 6.5
POTASSIUM SERPL-SCNC: 4.4 MMOL/L
PROTEIN URINE: NEGATIVE
PSA FREE FLD-MCNC: 31 %
PSA FREE SERPL-MCNC: 0.29 NG/ML
PSA SERPL-MCNC: 0.92 NG/ML
RED BLOOD CELLS URINE: 0 /HPF
SODIUM SERPL-SCNC: 139 MMOL/L
SPECIFIC GRAVITY URINE: 1.01
SQUAMOUS EPITHELIAL CELLS: 0 /HPF
UROBILINOGEN URINE: NORMAL
WHITE BLOOD CELLS URINE: 0 /HPF

## 2022-02-24 ENCOUNTER — APPOINTMENT (OUTPATIENT)
Dept: CARDIOLOGY | Facility: CLINIC | Age: 62
End: 2022-02-24
Payer: COMMERCIAL

## 2022-02-24 VITALS
BODY MASS INDEX: 23.57 KG/M2 | SYSTOLIC BLOOD PRESSURE: 125 MMHG | DIASTOLIC BLOOD PRESSURE: 82 MMHG | HEART RATE: 78 BPM | TEMPERATURE: 97.8 F | OXYGEN SATURATION: 100 % | RESPIRATION RATE: 18 BRPM | WEIGHT: 155 LBS

## 2022-02-24 DIAGNOSIS — R94.31 ABNORMAL ELECTROCARDIOGRAM [ECG] [EKG]: ICD-10-CM

## 2022-02-24 LAB — URINE CYTOLOGY: NORMAL

## 2022-02-24 PROCEDURE — 93306 TTE W/DOPPLER COMPLETE: CPT

## 2022-02-24 PROCEDURE — 93015 CV STRESS TEST SUPVJ I&R: CPT

## 2022-02-27 PROBLEM — R94.31 ABNORMAL ECG: Status: ACTIVE | Noted: 2019-03-08

## 2022-03-04 ENCOUNTER — APPOINTMENT (OUTPATIENT)
Dept: CARDIOLOGY | Facility: CLINIC | Age: 62
End: 2022-03-04

## 2023-02-03 ENCOUNTER — NON-APPOINTMENT (OUTPATIENT)
Age: 63
End: 2023-02-03

## 2023-02-03 ENCOUNTER — APPOINTMENT (OUTPATIENT)
Dept: CARDIOLOGY | Facility: CLINIC | Age: 63
End: 2023-02-03
Payer: COMMERCIAL

## 2023-02-03 VITALS
WEIGHT: 150 LBS | HEART RATE: 85 BPM | OXYGEN SATURATION: 98 % | RESPIRATION RATE: 18 BRPM | TEMPERATURE: 97.5 F | SYSTOLIC BLOOD PRESSURE: 145 MMHG | BODY MASS INDEX: 22.81 KG/M2 | DIASTOLIC BLOOD PRESSURE: 88 MMHG

## 2023-02-03 DIAGNOSIS — R06.02 SHORTNESS OF BREATH: ICD-10-CM

## 2023-02-03 PROCEDURE — 99213 OFFICE O/P EST LOW 20 MIN: CPT | Mod: 25

## 2023-02-03 PROCEDURE — 93306 TTE W/DOPPLER COMPLETE: CPT

## 2023-02-03 PROCEDURE — 93000 ELECTROCARDIOGRAM COMPLETE: CPT | Mod: 59

## 2023-02-03 PROCEDURE — 93015 CV STRESS TEST SUPVJ I&R: CPT

## 2023-02-17 ENCOUNTER — APPOINTMENT (OUTPATIENT)
Dept: UROLOGY | Facility: CLINIC | Age: 63
End: 2023-02-17
Payer: COMMERCIAL

## 2023-02-17 VITALS
BODY MASS INDEX: 23.43 KG/M2 | TEMPERATURE: 97.9 F | SYSTOLIC BLOOD PRESSURE: 128 MMHG | HEART RATE: 76 BPM | RESPIRATION RATE: 18 BRPM | DIASTOLIC BLOOD PRESSURE: 78 MMHG | WEIGHT: 154.1 LBS | OXYGEN SATURATION: 99 %

## 2023-02-17 DIAGNOSIS — N52.9 MALE ERECTILE DYSFUNCTION, UNSPECIFIED: ICD-10-CM

## 2023-02-17 PROCEDURE — 99213 OFFICE O/P EST LOW 20 MIN: CPT

## 2023-02-17 NOTE — ADDENDUM
[FreeTextEntry1] : Entered by MEAGAN LORA, acting as scribe for Dr. Maxime Rocha.\par The documentation recorded by the scribe accurately reflects the service I personally performed and the decisions made by me.

## 2023-02-17 NOTE — LETTER BODY
[FreeTextEntry1] : HaywardHermelindo DO Pat\par [address] [facility]\par [address] [facility], [place] [address]\par Phone: [phone]\par \par Dear Dr. Stewart\par \par Reason for Visit: Erectile Dysfunction.\par \par This is a 62 year-old male  s/p previous laminectomy for spinal stenosis and previous left testicular discomfort, presenting with erectile dysfunction. He obtained an unremarkable abdominal MRI at [facility] in 2016. He returns today for follow-up. Since he was last seen, the patient reports that he has been doing better off Cialis. He reports that he is overall well. He denies any testicular pain or discomfort. He denies any changes in health. All other review of systems are negative. Past medical history, family history and social history were unchanged. Medications and allergies were reviewed. He has no known allergies to medication. \par \par On examination, the patient is a healthy-appearing gentleman in no acute distress. He is alert and oriented follows commands. He has normal mood and affect. He is normocephalic. Oral no thrush. Neck is supple. Respirations are unlabored. His abdomen is soft and nontender. Liver is nonpalpable. Bladder is nonpalpable. No CVA tenderness. Neurologically he is grossly intact. No peripheral edema. Skin without gross abnormality. He has normal male external genitalia. Normal meatus. Bilateral testes are descended intrascrotally and normal to palpation. On rectal examination, there is normal sphincter tone. The prostate is clinically benign without focal induration or nodularity.\par \par His last BMP in February 2022 demonstrated normal renal functions, creatinine 1.14. His PSA was 0.92, which is within normal limits. His urinalysis was unremarkable. His urine cytology was negative.\par \par Assessment: Erectile Dysfunction.\par \par I counseled the patient. He is doing well. In terms of his erectile dysfunction, the patient has discontinued Cialis. He is doing well without the medication. I recommended the patient repeat BMP and PSA today to ensure stability. The patient will also obtain urinalysis today. He will follow-up in 1 year to ensure stability. Risks and alternatives were discussed. I answered the patient questions. The patient will follow-up as directed and will contact me with any questions or concerns. Thank you for the opportunity to participate in the care of Mr. HARPER. I will keep you updated on his progress.\par \par Plan: BMP. PSA. Urinalysis. Follow-up in 1 year.

## 2023-02-18 LAB
ANION GAP SERPL CALC-SCNC: 11 MMOL/L
BUN SERPL-MCNC: 19 MG/DL
CALCIUM SERPL-MCNC: 9 MG/DL
CHLORIDE SERPL-SCNC: 102 MMOL/L
CO2 SERPL-SCNC: 27 MMOL/L
CREAT SERPL-MCNC: 1.18 MG/DL
EGFR: 69 ML/MIN/1.73M2
GLUCOSE SERPL-MCNC: 79 MG/DL
POTASSIUM SERPL-SCNC: 4 MMOL/L
PSA FREE FLD-MCNC: 21 %
PSA FREE SERPL-MCNC: 0.31 NG/ML
PSA SERPL-MCNC: 1.44 NG/ML
SODIUM SERPL-SCNC: 139 MMOL/L

## 2023-02-19 LAB
APPEARANCE: CLEAR
BACTERIA: NEGATIVE
BILIRUBIN URINE: NEGATIVE
BLOOD URINE: NEGATIVE
COLOR: COLORLESS
GLUCOSE QUALITATIVE U: NEGATIVE
HYALINE CASTS: 0 /LPF
KETONES URINE: NEGATIVE
LEUKOCYTE ESTERASE URINE: NEGATIVE
MICROSCOPIC-UA: NORMAL
NITRITE URINE: NEGATIVE
PH URINE: 6.5
PROTEIN URINE: NEGATIVE
RED BLOOD CELLS URINE: 0 /HPF
SPECIFIC GRAVITY URINE: 1.01
SQUAMOUS EPITHELIAL CELLS: 0 /HPF
UROBILINOGEN URINE: NORMAL
WHITE BLOOD CELLS URINE: 0 /HPF

## 2023-02-20 NOTE — HISTORY OF PRESENT ILLNESS
[FreeTextEntry1] : 2/3/23 - Patient has been stable. He had Covid last year.  I advised patient to undergo an echocardiogram and a treadmill stress test for his elevated calcium score. \par \par 2/4/22 - Patient has been stable.  Patient denies CP, SOB, palpitations, or lightheadedness.  He is now on Simvastatin 10 mg and ASA 81 mg.  His recent LDL was 88.  His recent ferritin was 109.  I advised patient to undergo an echocardiogram and a treadmill stress test.  I will obtain insurance authorization (elevated calcium score, family history of CAD).  I advised patient to continue Simvastatin 10 mg.  He may stop ASA 81 mg.\par \par 1/22/21 - Patient has been stable.  Patient reports rare SOB with stairs.  Patient denies CP.  Patient denies palpitations.  Patient denies lightheadedness.  Patient denies h/o syncope.  He is concerned about his family history.  He is on no medications.  His LDL is borderline as per patient.  He did not pursue a calcium score test after last visit.  Patient underwent calcium score CT and the score is 125.  I advised patient to start statin therapy.\par \par 3/8/19 - Patient has family history of heart issues.  Father had bypass surgery and brother had stent placed recently.  Patient denies CP, SOB, palpitations, or lightheadedness.  Patient had back surgery a few years ago and he does back exercises since.  Patient would like to get a stress test pending insurance authorization.   I advised patient that he could get a calcium score test to see if he should start on statin therapy since he has borderline high  and family history.

## 2023-02-20 NOTE — CARDIOLOGY SUMMARY
[Normal] : normal [de-identified] : Patient underwent a treadmill stress test 1/22/21 and completed 12 minutes of Duncan protocol.  There was no ECG evidence of ischemia.  Following treadmill stress, there was no echocardiographic evidence of ischemia.   [de-identified] : Patient underwent an echocardiogram 1/22/21 and it showed normal LV function without significant valvular pathology.   [de-identified] : Patient underwent calcium score CT 2/19/21 and the score is 125.

## 2024-02-16 ENCOUNTER — APPOINTMENT (OUTPATIENT)
Dept: UROLOGY | Facility: CLINIC | Age: 64
End: 2024-02-16
Payer: COMMERCIAL

## 2024-02-16 VITALS
TEMPERATURE: 97.1 F | WEIGHT: 152 LBS | SYSTOLIC BLOOD PRESSURE: 116 MMHG | HEART RATE: 112 BPM | BODY MASS INDEX: 23.04 KG/M2 | OXYGEN SATURATION: 98 % | DIASTOLIC BLOOD PRESSURE: 78 MMHG | HEIGHT: 68 IN | RESPIRATION RATE: 18 BRPM

## 2024-02-16 DIAGNOSIS — N52.9 MALE ERECTILE DYSFUNCTION, UNSPECIFIED: ICD-10-CM

## 2024-02-16 LAB
APPEARANCE: CLEAR
BACTERIA: NEGATIVE /HPF
BILIRUBIN URINE: NEGATIVE
BLOOD URINE: NEGATIVE
CAST: 0 /LPF
COLOR: YELLOW
EPITHELIAL CELLS: 0 /HPF
GLUCOSE QUALITATIVE U: NEGATIVE MG/DL
KETONES URINE: NEGATIVE MG/DL
LEUKOCYTE ESTERASE URINE: NEGATIVE
MICROSCOPIC-UA: NORMAL
NITRITE URINE: NEGATIVE
PH URINE: 7.5
PROTEIN URINE: NEGATIVE MG/DL
PSA FREE FLD-MCNC: 27 %
PSA FREE SERPL-MCNC: 0.24 NG/ML
PSA SERPL-MCNC: 0.9 NG/ML
RED BLOOD CELLS URINE: 0 /HPF
SPECIFIC GRAVITY URINE: 1.01
UROBILINOGEN URINE: 0.2 MG/DL
WHITE BLOOD CELLS URINE: 0 /HPF

## 2024-02-16 PROCEDURE — 99213 OFFICE O/P EST LOW 20 MIN: CPT

## 2024-02-16 PROCEDURE — G2211 COMPLEX E/M VISIT ADD ON: CPT | Mod: NC,1L

## 2024-02-17 LAB
ANION GAP SERPL CALC-SCNC: 11 MMOL/L
BUN SERPL-MCNC: 16 MG/DL
CALCIUM SERPL-MCNC: 9.3 MG/DL
CHLORIDE SERPL-SCNC: 99 MMOL/L
CO2 SERPL-SCNC: 30 MMOL/L
CREAT SERPL-MCNC: 1.29 MG/DL
EGFR: 62 ML/MIN/1.73M2
GLUCOSE SERPL-MCNC: 92 MG/DL
POTASSIUM SERPL-SCNC: 3.8 MMOL/L
SODIUM SERPL-SCNC: 140 MMOL/L

## 2024-02-17 NOTE — LETTER BODY
[FreeTextEntry1] : Rosangela Stewart, DO [address] [place] [address] Phone: [phone]  Dear Dr. Stewart  Reason for Visit: Erectile Dysfunction.  This is a 63 year-old male  s/p previous laminectomy for spinal stenosis and previous left testicular discomfort, presenting with erectile dysfunction. He obtained an unremarkable abdominal MRI at [facility] in 2016. He returns today for follow-up. Since he was last seen, the patient reports that he has been doing better with Cialis. He reports that he is overall well. He denies any testicular pain or discomfort. He denies any changes in health. All other review of systems are negative. Past medical history, family history and social history were unchanged. Medications and allergies were reviewed. He has no known allergies to medication.  On examination, the patient is a healthy-appearing gentleman in no acute distress. He is alert and oriented follows commands. He has normal mood and affect. He is normocephalic. Oral no thrush. Neck is supple. Respirations are unlabored. His abdomen is soft and nontender. Liver is nonpalpable. Bladder is nonpalpable. No CVA tenderness. Neurologically he is grossly intact. No peripheral edema. Skin without gross abnormality. He has normal male external genitalia. Normal meatus. Bilateral testes are descended intrascrotally and normal to palpation. On rectal examination, there is normal sphincter tone. The prostate is clinically benign without focal induration or nodularity.  His last BMP in February 2023 demonstrated normal renal functions, creatinine 1.18. His PSA was 1.44, which is within normal limits. His urinalysis was unremarkable. His urine cytology was negative.   Assessment: Erectile Dysfunction.  I counseled the patient. He is doing well. In terms of his erectile dysfunction, the patient is doing well on Cialis. I recommended patient continue Cialis as needed. I recommended the patient repeat BMP and PSA today to ensure stability. He will repeat urinalysis and urine cytology to look for high grade urothelial carcinoma. He will follow-up in 1 year to ensure stability. Risks and alternatives were discussed. I answered the patient questions. The patient will follow-up as directed and will contact me with any questions or concerns. Thank you for the opportunity to participate in the care of Mr. HARPER. I will keep you updated on his progress.  Patient will continue longitudinal care for his complex and serious chronic condition.   Plan: Continue Cialis as needed. BMP. PSA. Urinalysis. Urine Cytology. Follow-up in 1 year.

## 2024-02-17 NOTE — ADDENDUM
[FreeTextEntry1] : Entered by Anitra Way, acting as scribe for Dr. Maxime Rocha. The documentation recorded by the scribe accurately reflects the service I personally performed and the decisions made by me.

## 2024-02-21 LAB — URINE CYTOLOGY: NORMAL

## 2024-02-24 PROBLEM — R93.1 ELEVATED CORONARY ARTERY CALCIUM SCORE: Status: ACTIVE | Noted: 2022-02-04

## 2024-02-24 NOTE — PHYSICAL EXAM
[General Appearance - Well Developed] : well developed [Normal Appearance] : normal appearance [Well Groomed] : well groomed [General Appearance - Well Nourished] : well nourished [No Deformities] : no deformities [General Appearance - In No Acute Distress] : no acute distress [Normal Conjunctiva] : the conjunctiva exhibited no abnormalities [Eyelids - No Xanthelasma] : the eyelids demonstrated no xanthelasmas [Normal Oral Mucosa] : normal oral mucosa [No Oral Cyanosis] : no oral cyanosis [No Oral Pallor] : no oral pallor [Normal Jugular Venous A Waves Present] : normal jugular venous A waves present [No Jugular Venous Marinelli A Waves] : no jugular venous marinelli A waves [Normal Jugular Venous V Waves Present] : normal jugular venous V waves present [Respiration, Rhythm And Depth] : normal respiratory rhythm and effort [Exaggerated Use Of Accessory Muscles For Inspiration] : no accessory muscle use [Auscultation Breath Sounds / Voice Sounds] : lungs were clear to auscultation bilaterally [Heart Rate And Rhythm] : heart rate and rhythm were normal [Heart Sounds] : normal S1 and S2 [Murmurs] : no murmurs present [Arterial Pulses Normal] : the arterial pulses were normal [Abdomen Soft] : soft [Edema] : no peripheral edema present [Abdomen Tenderness] : non-tender [Abnormal Walk] : normal gait [Abdomen Mass (___ Cm)] : no abdominal mass palpated [Gait - Sufficient For Exercise Testing] : the gait was sufficient for exercise testing [Nail Clubbing] : no clubbing of the fingernails [Cyanosis, Localized] : no localized cyanosis [] : no ischemic changes [Petechial Hemorrhages (___cm)] : no petechial hemorrhages [Oriented To Time, Place, And Person] : oriented to person, place, and time [Mood] : the mood was normal [Affect] : the affect was normal [No Anxiety] : not feeling anxious

## 2024-03-01 ENCOUNTER — APPOINTMENT (OUTPATIENT)
Dept: CARDIOLOGY | Facility: CLINIC | Age: 64
End: 2024-03-01
Payer: COMMERCIAL

## 2024-03-01 VITALS
RESPIRATION RATE: 18 BRPM | DIASTOLIC BLOOD PRESSURE: 80 MMHG | WEIGHT: 153 LBS | OXYGEN SATURATION: 98 % | BODY MASS INDEX: 23.26 KG/M2 | HEART RATE: 84 BPM | SYSTOLIC BLOOD PRESSURE: 130 MMHG | TEMPERATURE: 98 F

## 2024-03-01 DIAGNOSIS — Z91.89 OTHER SPECIFIED PERSONAL RISK FACTORS, NOT ELSEWHERE CLASSIFIED: ICD-10-CM

## 2024-03-01 DIAGNOSIS — E83.110 HEREDITARY HEMOCHROMATOSIS: ICD-10-CM

## 2024-03-01 DIAGNOSIS — R93.1 ABNORMAL FINDINGS ON DIAGNOSTIC IMAGING OF HEART AND CORONARY CIRCULATION: ICD-10-CM

## 2024-03-01 PROCEDURE — 93306 TTE W/DOPPLER COMPLETE: CPT

## 2024-03-01 PROCEDURE — 99214 OFFICE O/P EST MOD 30 MIN: CPT | Mod: 25

## 2024-03-01 PROCEDURE — ZZZZZ: CPT

## 2024-03-01 PROCEDURE — 93015 CV STRESS TEST SUPVJ I&R: CPT

## 2024-03-01 RX ORDER — SIMVASTATIN 10 MG/1
10 TABLET, FILM COATED ORAL
Refills: 0 | Status: ACTIVE | COMMUNITY

## 2024-03-05 NOTE — REASON FOR VISIT
[FreeTextEntry1] : 63 year-old male with hemochromatosis getting phlebotomy every 3 months, elevated calcium score of 125,  presents for followup.    Patient was last seen on 2/3/23 - Patient has been stable. He had Covid last year.  I advised patient to undergo an echocardiogram and a treadmill stress test for his elevated calcium score.  Patient underwent an echocardiogram and it showed normal LV function without significant valvular pathology. Patient underwent a treadmill stress test and completed 12 minutes of Duncan protocol.  There were upsloping ST depressions on ECG but no symptoms.  Following treadmill stress, there was no echocardiographic evidence of ischemia.   He is on Simvastatin 10 mg for cardioprotection.  Patient underwent an echocardiogram 2/4/22 and it showed normal LV function without significant valvular pathology.    Patient underwent a treadmill stress test 2/4/22 and completed 12 minutes of Duncan protocol.  There was no ECG evidence of ischemia.  Following treadmill stress, there was no echocardiographic evidence of ischemia.   Patient underwent calcium score CT 2/19/21 and the score is 125.  I advised patient to start statin therapy.  Patient underwent an echocardiogram 1/22/21 and it showed normal LV function without significant valvular pathology.    Patient underwent a treadmill stress test 1/22/21 and completed 12 minutes of Duncan protocol.  There was no ECG evidence of ischemia.  Following treadmill stress, there was no echocardiographic evidence of ischemia.

## 2024-03-05 NOTE — CARDIOLOGY SUMMARY
[Normal] : normal [de-identified] : Patient underwent a treadmill stress test 1/22/21 and completed 12 minutes of Duncan protocol.  There was no ECG evidence of ischemia.  Following treadmill stress, there was no echocardiographic evidence of ischemia.   [de-identified] : Patient underwent an echocardiogram 1/22/21 and it showed normal LV function without significant valvular pathology.   [de-identified] : Patient underwent calcium score CT 2/19/21 and the score is 125.

## 2024-03-05 NOTE — HISTORY OF PRESENT ILLNESS
[FreeTextEntry1] : 3/1/24 - Patient returns for followup.  Patient has been stable.  Patient denies CP, SOB, palpitations, or lightheadedness.  Patient denies h/o syncope.  He is getting getting phlebotomy every 3 months.  He is on Simvastatin 10 mg for elevated calcium score.  /80 HR 84.  Exam unremarkable.  I advised patient to undergo an echocardiogram and a treadmill stress test.  Patient underwent an echocardiogram and it showed normal LV function without significant valvular pathology. Patient underwent a treadmill stress test and completed 12 minutes of Duncan protocol.  There were upsloping ST depressions on ECG but no symptoms.  Following treadmill stress, there was no echocardiographic evidence of ischemia.  FU 1 year.   (1) Hemochromatosis - Patient underwent an echocardiogram and it showed normal LV function without significant valvular pathology.  No LV enlargement of LV dysfunction noted.  Patient was reassured.   (2) Elevated calcium score of 125 - Patient underwent a treadmill stress test and completed 12 minutes of Duncan protocol.  There were upsloping ST depressions on ECG but no symptoms.  Following treadmill stress, there was no echocardiographic evidence of ischemia.  Patient was reassured.  I advised patient to continue Simvastatin 10 mg for cardioprotection.  Recent LDL was 95.    (3) Followup - 1 year.    2/3/23 - Patient has been stable. He had Covid last year.  I advised patient to undergo an echocardiogram and a treadmill stress test for his elevated calcium score.  Patient underwent an echocardiogram and it showed normal LV function without significant valvular pathology. Patient underwent a treadmill stress test and completed 12 minutes of Duncan protocol.  There were upsloping ST depressions on ECG but no symptoms.  Following treadmill stress, there was no echocardiographic evidence of ischemia.   2/4/22 - Patient has been stable.  Patient denies CP, SOB, palpitations, or lightheadedness.  He is now on Simvastatin 10 mg and ASA 81 mg.  His recent LDL was 88.  His recent ferritin was 109.  I advised patient to undergo an echocardiogram and a treadmill stress test.  I will obtain insurance authorization (elevated calcium score, family history of CAD).  I advised patient to continue Simvastatin 10 mg.  He may stop ASA 81 mg.  1/22/21 - Patient has been stable.  Patient reports rare SOB with stairs.  Patient denies CP.  Patient denies palpitations.  Patient denies lightheadedness.  Patient denies h/o syncope.  He is concerned about his family history.  He is on no medications.  His LDL is borderline as per patient.  He did not pursue a calcium score test after last visit.  Patient underwent calcium score CT and the score is 125.  I advised patient to start statin therapy.  3/8/19 - Patient has family history of heart issues.  Father had bypass surgery and brother had stent placed recently.  Patient denies CP, SOB, palpitations, or lightheadedness.  Patient had back surgery a few years ago and he does back exercises since.  Patient would like to get a stress test pending insurance authorization.   I advised patient that he could get a calcium score test to see if he should start on statin therapy since he has borderline high  and family history.

## 2025-01-09 NOTE — REASON FOR VISIT
Detail Level: Detailed [FreeTextEntry1] : 61 year-old male with hemochromatosis getting phlebotomy every 3 months, elevated calcium score,  presents for followup.  \par \par Patient was last seen on 2/4/22 for followup.  Patient was stable.   He was on Simvastatin 10 mg and ASA 81 mg.  His recent LDL was 88.  Patient underwent an echocardiogram and it showed normal LV function without significant valvular pathology.  Patient underwent a treadmill stress test and completed 12 minutes of Duncan protocol.  There was no ECG evidence of ischemia.  Following treadmill stress, there was no echocardiographic evidence of ischemia.   I advised patient to continue Simvastatin 10 mg.  He may stop ASA 81 mg.\par \par He is on Simvastatin 10 mg for cardioprotection.\par \par Patient underwent an echocardiogram 2/4/22 and it showed normal LV function without significant valvular pathology.  \par \par Patient underwent a treadmill stress test 2/4/22 and completed 12 minutes of Duncan protocol.  There was no ECG evidence of ischemia.  Following treadmill stress, there was no echocardiographic evidence of ischemia. \par \par Patient underwent calcium score CT 2/19/21 and the score is 125.  I advised patient to start statin therapy.\par \par Patient underwent an echocardiogram 1/22/21 and it showed normal LV function without significant valvular pathology.  \par \par Patient underwent a treadmill stress test 1/22/21 and completed 12 minutes of Duncan protocol.  There was no ECG evidence of ischemia.  Following treadmill stress, there was no echocardiographic evidence of ischemia.

## 2025-02-18 ENCOUNTER — APPOINTMENT (OUTPATIENT)
Dept: UROLOGY | Facility: CLINIC | Age: 65
End: 2025-02-18
Payer: COMMERCIAL

## 2025-02-18 VITALS
BODY MASS INDEX: 23.95 KG/M2 | TEMPERATURE: 97.7 F | DIASTOLIC BLOOD PRESSURE: 95 MMHG | OXYGEN SATURATION: 98 % | SYSTOLIC BLOOD PRESSURE: 149 MMHG | HEIGHT: 68 IN | WEIGHT: 158 LBS | HEART RATE: 81 BPM | RESPIRATION RATE: 18 BRPM

## 2025-02-18 DIAGNOSIS — N52.9 MALE ERECTILE DYSFUNCTION, UNSPECIFIED: ICD-10-CM

## 2025-02-18 PROCEDURE — G2211 COMPLEX E/M VISIT ADD ON: CPT | Mod: NC

## 2025-02-18 PROCEDURE — 99213 OFFICE O/P EST LOW 20 MIN: CPT

## 2025-02-19 LAB
ANION GAP SERPL CALC-SCNC: 14 MMOL/L
APPEARANCE: CLEAR
BACTERIA: NEGATIVE /HPF
BILIRUBIN URINE: NEGATIVE
BLOOD URINE: NEGATIVE
BUN SERPL-MCNC: 18 MG/DL
CALCIUM SERPL-MCNC: 9.1 MG/DL
CAST: 0 /LPF
CHLORIDE SERPL-SCNC: 101 MMOL/L
CO2 SERPL-SCNC: 25 MMOL/L
COLOR: YELLOW
CREAT SERPL-MCNC: 1.18 MG/DL
EGFR: 68 ML/MIN/1.73M2
EPITHELIAL CELLS: 0 /HPF
GLUCOSE QUALITATIVE U: NEGATIVE MG/DL
GLUCOSE SERPL-MCNC: 75 MG/DL
KETONES URINE: NEGATIVE MG/DL
LEUKOCYTE ESTERASE URINE: NEGATIVE
MICROSCOPIC-UA: NORMAL
NITRITE URINE: NEGATIVE
PH URINE: 7
POTASSIUM SERPL-SCNC: 4.4 MMOL/L
PROTEIN URINE: NEGATIVE MG/DL
PSA FREE FLD-MCNC: 28 %
PSA FREE SERPL-MCNC: 0.28 NG/ML
PSA SERPL-MCNC: 0.98 NG/ML
RED BLOOD CELLS URINE: 0 /HPF
SODIUM SERPL-SCNC: 140 MMOL/L
SPECIFIC GRAVITY URINE: 1
URINE CYTOLOGY: NORMAL
UROBILINOGEN URINE: 0.2 MG/DL
WHITE BLOOD CELLS URINE: 0 /HPF

## 2025-03-07 ENCOUNTER — APPOINTMENT (OUTPATIENT)
Dept: CARDIOLOGY | Facility: CLINIC | Age: 65
End: 2025-03-07

## 2025-03-07 VITALS
DIASTOLIC BLOOD PRESSURE: 76 MMHG | OXYGEN SATURATION: 97 % | HEART RATE: 74 BPM | RESPIRATION RATE: 18 BRPM | BODY MASS INDEX: 23.57 KG/M2 | SYSTOLIC BLOOD PRESSURE: 153 MMHG | WEIGHT: 155 LBS

## 2025-03-07 DIAGNOSIS — R07.89 OTHER CHEST PAIN: ICD-10-CM

## 2025-03-07 DIAGNOSIS — R93.1 ABNORMAL FINDINGS ON DIAGNOSTIC IMAGING OF HEART AND CORONARY CIRCULATION: ICD-10-CM

## 2025-03-07 DIAGNOSIS — E83.110 HEREDITARY HEMOCHROMATOSIS: ICD-10-CM

## 2025-03-07 PROCEDURE — 93015 CV STRESS TEST SUPVJ I&R: CPT

## 2025-03-07 PROCEDURE — 93306 TTE W/DOPPLER COMPLETE: CPT

## 2025-03-07 PROCEDURE — 99214 OFFICE O/P EST MOD 30 MIN: CPT | Mod: 25
